# Patient Record
Sex: MALE | Race: WHITE | Employment: STUDENT | URBAN - METROPOLITAN AREA
[De-identification: names, ages, dates, MRNs, and addresses within clinical notes are randomized per-mention and may not be internally consistent; named-entity substitution may affect disease eponyms.]

---
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
## 2018-08-16 ENCOUNTER — OFFICE VISIT (OUTPATIENT)
Dept: OPHTHALMOLOGY | Facility: CLINIC | Age: 18
End: 2018-08-16
Attending: OPHTHALMOLOGY
Payer: COMMERCIAL

## 2018-08-16 DIAGNOSIS — H02.055 TRICHIASIS OF LEFT LOWER EYELID: Primary | ICD-10-CM

## 2018-08-16 DIAGNOSIS — H04.123 DRY EYE SYNDROME, BILATERAL: ICD-10-CM

## 2018-08-16 DIAGNOSIS — H10.13 ALLERGIC CONJUNCTIVITIS, BILATERAL: ICD-10-CM

## 2018-08-16 PROCEDURE — 67820 REVISE EYELASHES: CPT | Mod: ZF | Performed by: OPHTHALMOLOGY

## 2018-08-16 PROCEDURE — 68761 CLOSE TEAR DUCT OPENING: CPT | Mod: ZF | Performed by: OPHTHALMOLOGY

## 2018-08-16 PROCEDURE — G0463 HOSPITAL OUTPT CLINIC VISIT: HCPCS | Mod: 25

## 2018-08-16 RX ORDER — CYCLOSPORINE 0.5 MG/ML
1 EMULSION OPHTHALMIC 2 TIMES DAILY
Qty: 180 EACH | Refills: 3 | Status: SHIPPED | OUTPATIENT
Start: 2018-08-16 | End: 2019-01-28

## 2018-08-16 ASSESSMENT — CONF VISUAL FIELD
OD_NORMAL: 1
OS_NORMAL: 1
METHOD: COUNTING FINGERS

## 2018-08-16 ASSESSMENT — TONOMETRY
IOP_METHOD: TONOPEN
OS_IOP_MMHG: 15
OD_IOP_MMHG: 17

## 2018-08-16 ASSESSMENT — REFRACTION_WEARINGRX
OS_AXIS: 069
OD_SPHERE: -6.00
OS_CYLINDER: +0.25
OS_SPHERE: -4.50
SPECS_TYPE: SVL
OD_CYLINDER: +0.50
OD_AXIS: 118

## 2018-08-16 ASSESSMENT — VISUAL ACUITY
OS_CC: 20/20
OS_CC+: -2
OD_CC+: -1
OD_CC: 20/20
CORRECTION_TYPE: GLASSES
METHOD: SNELLEN - LINEAR

## 2018-08-16 NOTE — PROGRESS NOTES
CC: F/up for allergic vs. Atopic keratoconjunctivitis    17 yo male with history of eczema and asthma and chronic conjunctivitis x 2 years, symptoms nearly year round      Interval:     Vision stable. Symptoms stable, controlled. Sometimes red. Denies photophobia or flashes. Has few floaters but has had them for years.     He has not been using contact lens, as was unable to tolerate CL prior      Cetirizine 10 mg daily  Patanol: stopped  Restasis BID BE  Pred Healon BID BE  PFATs prn, last used 1 week ago      A/P:      1. Mild allergic / atopic conjunctivitis both eyes.  Dry eye with low aqueous tear production. Seasonal variation but eyes remain red even when upper respiratory symptoms are better.  Significant dryness symptoms.    - surface looks good today   - healthy surface, no punctate epithelial erosions.  Very low tear lake.      - continue pred healon 0.1% to twice a day and restasis twice a day    - Continue cetirizine 10 mg daily, PFAT as needed, try switching brands    - BLL PPs placed (silicone) last visit, still in place    - place UL PPs today    2. Trichiasis CLARISSE  -misdirected lash pulled today    Ok to do a trial off pred healon on his own and assess symptoms  Trial off cetirizine if desired, reassess symptoms  Preservative free artificial tears 10 min before / after restasis to modulate burning symptoms  Return to clinic 6 months, prn sooner      Melissa Barcenas MD  PGY-5, Cornea Fellow      ~~~~~~~~~~~~~~~~~~~~~~~~~~~~~~~~~~~~~~~~~~~~~~~~~~~~~~~~~~~~~~~~    Complete documentation of historical and exam elements from today's encounter can be found in the full encounter summary report (not reduplicated in this progress note). I personally obtained the chief complaint(s) and history of present illness.  I confirmed and edited as necessary the review of systems, past medical/surgical history, family history, social history, and examination findings as documented by others.  I examined the  patient myself, and I personally reviewed the relevant tests, images, and reports as documented above. I formulated and edited as necessary the assessment and plan and discussed the findings and management plan with the patient and family.     I was personally present for the entirety of the in-office procedure.     Wilber Lanza MD, MA  Director, Cornea & Anterior Segment  [facility] Department of Ophthalmology & Visual Neuroscience

## 2018-08-16 NOTE — NURSING NOTE
Chief Complaints and History of Present Illnesses   Patient presents with     Follow Up For     Mild allergic / atopic conjunctivitis both eyes.     HPI    Affected eye(s):  Both   Symptoms:     No blurred vision   No decreased vision   Floaters (Comment: Pt only sees when there is bright backround, they are very subtle.)   No flashes   No tearing   Dryness   Itching   No burning   No photophobia         Do you have eye pain now?:  No      Comments:  Pt says vision hasn't changed.  Feels symptoms are pretty well controlled.  Restasis makes eyes feel dry for 20 min after application.  Sane effect with Prednisone Drops but less dry for less time.      Martir Carter [facility] August 16, 2018 9:47 AM  Lara Gutierrez@ [facility] 10:01 AM August 16, 2018

## 2018-08-16 NOTE — MR AVS SNAPSHOT
After Visit Summary   2018    Mc Forde    MRN: [id number]           Patient Information     Date Of Birth          2000        Visit Information        Provider Department      2018 9:15 AM Wilber Lanza MD [facility]        Today's Diagnoses     Trichiasis of left lower eyelid    -  1    Allergic conjunctivitis, bilateral        Dry eye syndrome, bilateral           Follow-ups after your visit        Your next 10 appointments already scheduled     2019 12:30 PM CST   RETURN CORNEA with Wilber Lanza MD   Eye Clinic ([facility])    [address]  9[facility] [address] [place] [id number]   [phone]              Who to contact     Please call your clinic at [phone] to:    Ask questions about your health    Make or cancel appointments    Discuss your medicines    Learn about your test results    Speak to your doctor            Additional Information About Your Visit        MyChart Information     Sedimapt is an electronic gateway that provides easy, online access to your medical records. With Mailbox, you can request a clinic appointment, read your test results, renew a prescription or communicate with your care team.     To sign up for Sedimapt visit the website at [email or web address]   You will be asked to enter the access code listed below, as well as some personal information. Please follow the directions to create your username and password.     Your access code is: [email or web address]  Expires: 10/22/2018  6:31 AM     Your access code will  in 90 days. If you need help or a new code, please contact your [facility] or call [phone] for assistance.        Care EveryWhere ID     This is your Care EveryWhere ID. This could be used by other organizations to access your [place] medical records  [id number]         Blood Pressure from Last 3 Encounters:   12 120/55    04/18/12 123/61    Weight from Last 3 Encounters:   09/03/12 61.2 kg (135 lb) (75 %)*   04/18/12 63.7 kg (140 lb 6.9 oz) (85 %)*     * Growth percentiles are based on [facility] 2-20 Years data.              We Performed the Following     Epilation of Trichiasis, Forceps     Punctal Closure, Plugs          Where to get your medicines      These medications were sent to [facility] - [place], [place] - [address]  [address] , [facility] [address]     Phone:  [phone]     cycloSPORINE 0.05 % ophthalmic emulsion    prednisolone 0.125% and hyaluronate in balanced salt Susp compounded ophthalmic suspension          Primary Care Provider Office Phone # Fax #    Don Saenz [phone]       XXX RETIRED XXX [address]  [facility] [address]        Equal Access to Services     [facility] : Hadii aad ku hadasho Sohuberali, waaxda luqadaha, qaybta kaalmada adeegyada, waxay manuelin haychilangon alonso ceballos . So [facility] [phone].    ATENCIÓN: Si habla español, tiene a medeiros disposición servicios gratuitos de asistencia lingüística. Tete al [phone].    We comply with applicable federal civil rights laws and [place] laws. We do not discriminate on the basis of race, color, national origin, age, disability, sex, sexual orientation, or gender identity.            Thank you!     Thank you for choosing EYE CLINIC  for your care. Our goal is always to provide you with excellent care. Hearing back from our patients is one way we can continue to improve our services. Please take a few minutes to complete the written survey that you may receive in the mail after your visit with us. Thank you!             Your Updated Medication List - Protect others around you: Learn how to safely use, store and throw away your medicines at www.disposemymeds.org.          This list is accurate as of 8/16/18 11:31 AM.  Always use your most recent med list.                   Brand Name  Dispense Instructions for use Diagnosis    albuterol 108 (90 Base) MCG/ACT Aepb inhaler    PROAIR RESPICLICK     Inhale into the lungs as needed        carboxymethylcellul-glycerin 0.5-0.9 % Soln ophthalmic solution    OPTIVE/REFRESH OPTIVE    90 each    Place 1 drop into both eyes 4 times daily    Dry eyes       cetirizine 10 MG tablet    zyrTEC     Take 10 mg by mouth daily        cycloSPORINE 0.05 % ophthalmic emulsion    RESTASIS    180 each    Place 1 drop into both eyes 2 times daily    Allergic conjunctivitis, bilateral, Dry eye syndrome, bilateral       fluticasone furoate 100 MCG/ACT inhaler    ARNUITY ELLIPTA     Inhale 1 puff into the lungs daily        MAXALT PO      Take by mouth as needed        prednisolone 0.125% and hyaluronate in balanced salt Susp compounded ophthalmic suspension     2 Bottle    Place 1 drop into both eyes 2 times daily bid    Allergic conjunctivitis, bilateral

## 2018-08-21 ENCOUNTER — OFFICE VISIT (OUTPATIENT)
Dept: OPHTHALMOLOGY | Facility: CLINIC | Age: 18
End: 2018-08-21
Attending: OPHTHALMOLOGY
Payer: COMMERCIAL

## 2018-08-21 DIAGNOSIS — H16.223 KERATOCONJUNCTIVITIS SICCA, NOT SPECIFIED AS SJOGREN'S, BILATERAL: ICD-10-CM

## 2018-08-21 DIAGNOSIS — H10.13 ALLERGIC CONJUNCTIVITIS, BILATERAL: ICD-10-CM

## 2018-08-21 DIAGNOSIS — H02.055 TRICHIASIS OF LEFT LOWER EYELID: Primary | ICD-10-CM

## 2018-08-21 PROCEDURE — G0463 HOSPITAL OUTPT CLINIC VISIT: HCPCS | Mod: ZF

## 2018-08-21 ASSESSMENT — VISUAL ACUITY
OS_CC: 20/20-
OD_CC: 20/20
METHOD: SNELLEN - LINEAR

## 2018-08-21 ASSESSMENT — TONOMETRY
OS_IOP_MMHG: 14
OD_IOP_MMHG: 17
IOP_METHOD: ICARE

## 2018-08-21 NOTE — NURSING NOTE
Chief Complaints and History of Present Illnesses   Patient presents with     Follow Up For     removal of punctal plugs     HPI    Affected eye(s):  Both   Symptoms:     Tearing      Duration:  2 weeks   Frequency:  Constant       Do you have eye pain now?:  No      Comments:  Eyes are watering too much from plugs placed in both eyes and would like them removed.  Vision has been warped and blurry each eye since he has too many tears on the surface of his eyes.    Terrie Head, COA 1:29 PM 08/21/2018

## 2018-08-21 NOTE — MR AVS SNAPSHOT
After Visit Summary   2018    Mc Forde    MRN: [id number]           Patient Information     Date Of Birth          2000        Visit Information        Provider Department      2018 1:30 PM Milad Lindsey MD Eye Clinic        Today's Diagnoses     Trichiasis of left lower eyelid    -  1    Allergic conjunctivitis, bilateral        Keratoconjunctivitis sicca, not specified as Sjogren's, bilateral - Both Eyes           Follow-ups after your visit        Follow-up notes from your care team     Return for as scheduled in 6 months with Cornea.      Your next 10 appointments already scheduled     2019 12:30 PM CST   RETURN CORNEA with Wilber Lanza MD   Eye Clinic ([facility])    [place] [address]  [address] [address] [phone]              Who to contact     Please call your clinic at [phone] to:    Ask questions about your health    Make or cancel appointments    Discuss your medicines    Learn about your test results    Speak to your doctor            Additional Information About Your Visit        MyChart Information     King[employer] Entertainment is an electronic gateway that provides easy, online access to your medical records. With [employer], you can request a clinic appointment, read your test results, renew a prescription or communicate with your care team.     To sign up for [employer] visit the website at [email or web address]   You will be asked to enter the access code listed below, as well as some personal information. Please follow the directions to create your username and password.     Your access code is: [email or web address]  Expires: 10/22/2018  6:31 AM     Your access code will  in 90 days. If you need help or a new code, please contact your [facility] or call [phone] for assistance.        Care EveryWhere ID     This is your Care EveryWhere ID. This could be used  by other organizations to access your [place] medical records  [id number]         Blood Pressure from Last 3 Encounters:   09/03/12 120/55   04/18/12 123/61    Weight from Last 3 Encounters:   09/03/12 61.2 kg (135 lb) (75 %)*   04/18/12 63.7 kg (140 lb 6.9 oz) (85 %)*     * Growth percentiles are based on CDC 2-20 Years data.              Today, you had the following     No orders found for display       Primary Care Provider Office Phone # Fax #    Don Saenz [phone]       XXX RETIRED XXX [address]  [place] [address]        Equal Access to Services     [facility] : Hadii aad ku hadasho Sohuberali, waaxda luqadaha, qaybta kaalmada adeegyada, waxay idiin haytejas ceballos . So [facility] [phone].    ATENCIÓN: Si habla español, tiene a medeiros disposición servicios gratuitos de asistencia lingüística. [facility] [phone].    We comply with applicable federal civil rights laws and [place] laws. We do not discriminate on the basis of race, color, national origin, age, disability, sex, sexual orientation, or gender identity.            Thank you!     Thank you for choosing EYE CLINIC  for your care. Our goal is always to provide you with excellent care. Hearing back from our patients is one way we can continue to improve our services. Please take a few minutes to complete the written survey that you may receive in the mail after your visit with us. Thank you!             Your Updated Medication List - Protect others around you: Learn how to safely use, store and throw away your medicines at www.disposemymeds.org.          This list is accurate as of 8/21/18 11:59 PM.  Always use your most recent med list.                   Brand Name Dispense Instructions for use Diagnosis    albuterol 108 (90 Base) MCG/ACT Aepb inhaler    PROAIR RESPICLICK     Inhale into the lungs as needed        carboxymethylcellul-glycerin 0.5-0.9 % Soln ophthalmic solution    OPTIVE/REFRESH  OPTIVE    90 each    Place 1 drop into both eyes 4 times daily    Dry eyes       cetirizine 10 MG tablet    zyrTEC     Take 10 mg by mouth daily        cycloSPORINE 0.05 % ophthalmic emulsion    RESTASIS    180 each    Place 1 drop into both eyes 2 times daily    Allergic conjunctivitis, bilateral, Dry eye syndrome, bilateral       fluticasone furoate 100 MCG/ACT inhaler    ARNUITY ELLIPTA     Inhale 1 puff into the lungs daily        MAXALT PO      Take by mouth as needed        prednisolone 0.125% and hyaluronate in balanced salt Susp compounded ophthalmic suspension     2 Bottle    Place 1 drop into both eyes 2 times daily bid    Allergic conjunctivitis, bilateral

## 2018-08-21 NOTE — PROGRESS NOTES
CC: Follow up for punctal plug removal placed on 8/16/18    History of allergic vs. Atopic keratoconjunctivitis    17 yo male with history of eczema and asthma and chronic conjunctivitis x 2 years, symptoms nearly year round  He has not been using contact lens, as was unable to tolerate CL prior    Interval: Reports epiphora interfering with daily tasks since having upper lids plugged; otherwise no new symptoms, vision stable    Cetirizine 10 mg daily  Patanol: stopped  Restasis BID BE  Pred Healon BID BE  PFATs prn, last used 1 week ago    A/P:  1. Mild allergic / atopic conjunctivitis both eyes.    - Dry eye with low aqueous tear production.  - Seasonal variation but eyes remain red even when upper respiratory symptoms are better.    - Surface looks good today, no punctate epithelial erosions.  - Bilateral upper lid plugs removed at slit lamp today, lower lid plugs in place  - Continue pred healon 0.1% to twice a day and restasis twice a day  - Continue cetirizine 10 mg daily, PFAT as needed, try switching brands    2. Trichiasis CLARISSE  - misdirected lashes pulled last visit, normal lash line today    Ok to do a trial off pred healon on his own and assess symptoms  Trial off cetirizine if desired, reassess symptoms  Preservative free artificial tears 10 min before / after restasis to modulate burning symptoms  Return to clinic 6 months, prn sooner    Milad Lindsey MD  PGY-3 Ophthalmology Resident  [phone]      Attending Physician Attestation:  Complete documentation of historical and exam elements from today's encounter can be found in the full encounter summary report (not reduplicated in this progress note).  I personally obtained the chief complaint(s) and history of present illness.  I confirmed and edited as necessary the review of systems, past medical/surgical history, family history, social history, and examination findings as documented by others; and I examined the patient myself.  I personally  reviewed the relevant tests, images, and reports as documented above.  I formulated and edited as necessary the assessment and plan and discussed the findings and management plan with the patient and family. . - Ravin Root MD

## 2018-08-28 ASSESSMENT — REFRACTION_WEARINGRX
OS_CYLINDER: +0.25
OD_CYLINDER: +0.50
OS_AXIS: 069
OD_SPHERE: -6.00
SPECS_TYPE: SVL
OS_SPHERE: -4.50
OD_AXIS: 118

## 2018-08-28 ASSESSMENT — VISUAL ACUITY: CORRECTION_TYPE: GLASSES

## 2018-09-24 ENCOUNTER — TELEPHONE (OUTPATIENT)
Dept: OPHTHALMOLOGY | Facility: CLINIC | Age: 18
End: 2018-09-24

## 2018-09-24 NOTE — TELEPHONE ENCOUNTER
[facility] Call Center    Phone Message    May a detailed message be left on voicemail: yes    Reason for Call: Other: The pt is asking that his RX of Restasis be transferred [facility].Please call the pt with any questions. Thanks.      Action Taken: Message routed to:  [facility] ([facility]

## 2019-01-21 ENCOUNTER — TELEPHONE (OUTPATIENT)
Dept: OPHTHALMOLOGY | Facility: CLINIC | Age: 19
End: 2019-01-21

## 2019-01-21 DIAGNOSIS — H10.13 ALLERGIC CONJUNCTIVITIS, BILATERAL: ICD-10-CM

## 2019-01-21 DIAGNOSIS — H16.229 KERATOCONJUNCTIVITIS SICCA: Primary | ICD-10-CM

## 2019-01-21 DIAGNOSIS — H04.123 DRY EYE SYNDROME, BILATERAL: ICD-10-CM

## 2019-01-21 NOTE — TELEPHONE ENCOUNTER
Prior Authorization Retail Medication Request    Medication/Dose: cycloSPORINE (RESTASIS) 0.05 % ophthalmic emulsion/ 1 drop into both eyes 2 times daily  ICD code (if different than what is on RX):  See chart  Previously Tried and Failed: See chart  Rationale:  See chart    Insurance Name: BCBS - [place] ([facility])  Insurance ID:  [id number]      Pharmacy Information (if different than what is on RX)  [facility]  Phone: [phone]    Surescripts: [id number]

## 2019-01-22 NOTE — TELEPHONE ENCOUNTER
Central Prior Authorization Team   Phone: [phone]    PA Initiation    Medication: cycloSPORINE (RESTASIS) 0.05 % ophthalmic emulsion  Insurance Company: [employer] [place] - Phone [phone] Fax [phone]  [id number] [place], [place] - [address]   Filling Pharmacy Phone: [phone]  Filling Pharmacy Fax:    Start Date: 1/22/2019

## 2019-01-24 NOTE — TELEPHONE ENCOUNTER
PRIOR AUTHORIZATION DENIED    Medication: cycloSPORINE (RESTASIS) 0.05 % ophthalmic emulsion - denied    Denial Date: 1/24/2019    Denial Rational: script has been denied because pt does not have an approved diagnosis                  Appeal Information:

## 2019-01-28 RX ORDER — CYCLOSPORINE 0.5 MG/ML
1 EMULSION OPHTHALMIC 2 TIMES DAILY
Qty: 180 EACH | Refills: 3 | Status: SHIPPED | OUTPATIENT
Start: 2019-01-28 | End: 2019-03-14

## 2019-01-30 NOTE — TELEPHONE ENCOUNTER
Medication Appeal Initiation    We have initiated an appeal for the requested medication:  Medication: cycloSPORINE (RESTASIS) 0.05 % ophthalmic emulsion - appeal submitted  Appeal Start Date:  1/30/2019  Insurance Company: [employer] [place] - Phone [phone] Fax [phone]  Comments:  Faxed appeal [facility] [phone]

## 2019-03-08 NOTE — TELEPHONE ENCOUNTER
MEDICATION APPEAL DENIED    Medication: cycloSPORINE (RESTASIS) 0.05 % ophthalmic emulsion - appeal denied    Denial Date: 2/8/2019    Denial Rational: appeal is denied because pt does not a FDA approved diagnosis    Second Level Appeal Information: waiting for denial fax

## 2019-03-12 NOTE — TELEPHONE ENCOUNTER
Note to Dr. Lang for review if ok to try Xiidra vs letter  restasis PA denied     Tony Hoffman RN 12:31 PM 03/12/19

## 2019-03-13 RX ORDER — CYCLOSPORINE 0.5 MG/ML
1 EMULSION OPHTHALMIC 2 TIMES DAILY
Qty: 180 EACH | Refills: 3 | Status: CANCELLED | OUTPATIENT
Start: 2019-03-13

## 2019-03-14 ENCOUNTER — TELEPHONE (OUTPATIENT)
Dept: OPHTHALMOLOGY | Facility: CLINIC | Age: 19
End: 2019-03-14

## 2019-03-14 DIAGNOSIS — H16.229 KERATOCONJUNCTIVITIS SICCA: ICD-10-CM

## 2019-03-14 DIAGNOSIS — H16.223 KERATITIS SICCA, BILATERAL: Primary | ICD-10-CM

## 2019-03-14 RX ORDER — CYCLOSPORINE 0.5 MG/ML
1 EMULSION OPHTHALMIC 2 TIMES DAILY
Qty: 180 EACH | Refills: 3
Start: 2019-03-14 | End: 2020-04-29

## 2019-03-14 NOTE — TELEPHONE ENCOUNTER
Reviewed by Dr. Lang  restasis diagnosis updated from keratoconjunctivits sicca M35.01 to H16.223 keratoconjunctivitis sicca    Note to PA team to restart PA process with updated diagnosis code  Tony Hoffman RN 6:58 AM 03/14/19

## 2019-03-19 NOTE — TELEPHONE ENCOUNTER
Prior Authorization Approval    Authorization Effective Date: 2/28/2019  Authorization Expiration Date: 2/28/2020  Medication: cycloSPORINE (RESTASIS) 0.05 % ophtha - APPROVED  Approved Dose/Quantity:   Reference #:     Insurance Company: [employer] [place] - Phone [phone] Fax [phone]  Expected CoPay:       CoPay Card Available:      Foundation Assistance Needed:    Which Pharmacy is filling the prescription (Not needed for infusion/clinic administered): [id number] [place], [place] - [address] [facility]  Pharmacy Notified: Yes  Patient Notified: Comment:  **Pharmacy will notify patient when script is ready for .**

## 2019-03-28 ENCOUNTER — OFFICE VISIT (OUTPATIENT)
Dept: OPHTHALMOLOGY | Facility: CLINIC | Age: 19
End: 2019-03-28
Attending: OPHTHALMOLOGY
Payer: COMMERCIAL

## 2019-03-28 DIAGNOSIS — H16.229 KERATOCONJUNCTIVITIS SICCA: Primary | ICD-10-CM

## 2019-03-28 DIAGNOSIS — H10.13 ALLERGIC CONJUNCTIVITIS, BILATERAL: ICD-10-CM

## 2019-03-28 PROCEDURE — G0463 HOSPITAL OUTPT CLINIC VISIT: HCPCS | Mod: ZF

## 2019-03-28 RX ORDER — FLUOROMETHOLONE 0.1 %
1 SUSPENSION, DROPS(FINAL DOSAGE FORM)(ML) OPHTHALMIC (EYE) 2 TIMES DAILY
Qty: 10 ML | Refills: 4 | Status: SHIPPED | OUTPATIENT
Start: 2019-03-28

## 2019-03-28 RX ORDER — FLUOROMETHOLONE 0.1 %
1 SUSPENSION, DROPS(FINAL DOSAGE FORM)(ML) OPHTHALMIC (EYE) 2 TIMES DAILY
Qty: 10 ML | Refills: 4 | Status: SHIPPED | OUTPATIENT
Start: 2019-03-28 | End: 2019-03-28

## 2019-03-28 ASSESSMENT — CONF VISUAL FIELD
OS_NORMAL: 1
METHOD: COUNTING FINGERS
OD_NORMAL: 1

## 2019-03-28 ASSESSMENT — VISUAL ACUITY
METHOD: SNELLEN - LINEAR
OD_CC: 20/20
CORRECTION_TYPE: GLASSES
OS_CC: 20/20

## 2019-03-28 ASSESSMENT — REFRACTION_WEARINGRX
OS_CYLINDER: +0.25
OD_CYLINDER: +0.50
SPECS_TYPE: SVL
OS_SPHERE: -4.50
OS_AXIS: 069
OD_AXIS: 118
OD_SPHERE: -6.00

## 2019-03-28 ASSESSMENT — TONOMETRY
IOP_METHOD: ICARE
OS_IOP_MMHG: 12
OD_IOP_MMHG: 14

## 2019-03-28 NOTE — NURSING NOTE
Chief Complaints and History of Present Illnesses   Patient presents with     Follow Up     Chief Complaint(s) and History of Present Illness(es)     Follow up for atopic conjunctivitis both eyes and Trichiasis CLARISSE.    The patient states he has been without the PredHealon for two weeks. He has noticed increased irritation and redness without the PredHealon.  The Refresh Plus makes his eyes feels worse after some time.  The Restasis still burns for ten minutes each time he inserts it.  The patient notes that his BCBS has denied his dropsf PredHealon and Restasis.  Clarissa Guerrero, FABIENNE 2:04 PM 03/28/2019

## 2019-03-28 NOTE — LETTER
19    To whom it may concern:      Mr. Mc Forde (: 2000) is [facility] for bilateral chronic keratoconjunctivitis. He has previously tried multiple topical eye drops for his symptoms including Restasis. However, due to lack of insurance coverage, the patient requires alternative treatment. Atopic keratoconjunctivitis often improves with treatment targeted at minimizing ocular surface inflammation. Xiidra would be an acceptable alternative to Restasis for the treatment of this condition.    Please contact my office if you have any questions or concerns.       Sincerely,        Eulalia Gonsalves MD

## 2019-03-28 NOTE — PROGRESS NOTES
CC: F/up for allergic vs. Atopic keratoconjunctivitis    18 yo male with history of eczema and asthma and chronic conjunctivitis x 2 years, symptoms nearly year round      Interval:   Vision stable. Symptoms stable, controlled. Sometimes red. Denies photophobia or flashes. Has few floaters but has had them for years.     He has not been using contact lens, as was unable to tolerate CL prior    Off Pred Healon 2 weeks; on Restasis still but insurance isn't covering.      Cetirizine 10 mg daily PRN  Restasis BID OU  Pred Healon BID OU (off for 2 weeks due to spring break)  PFATs prn, last used 1 week ago      A/P:    1. Mild allergic / atopic conjunctivitis both eyes.  Dry eye with low aqueous tear production. Seasonal variation but eyes remain red even when upper respiratory symptoms are better.  Significant dryness symptoms.    - surface looks good today     - healthy surface, no punctate epithelial erosions.  Very low tear lake.      - continue pred healon 0.1% to twice a day;  - insurance not paying for restasis - discussed options of trying Xiidra - will try Xiidra BID OU    - Continue cetirizine 10 mg daily, PFAT as needed, try switching brands    - LLL punctal plugs in place; had bilateral upper plugs     2. Trichiasis CLARISSE  -misdirected lash pulled today      Preservative free artificial tears 10 min before / after restasis to modulate burning symptoms  Return to clinic 6 months, prn sooner      Gilson Lang M.D.  PGY-3, Ophthalmology      ~~~~~~~~~~~~~~~~~~~~~~~~~~~~~~~~~~~~~~~~~~~~~~~~~~~~~~~~~~~~~~~~    Complete documentation of historical and exam elements from today's encounter can be found in the full encounter summary report (not reduplicated in this progress note). I personally obtained the chief complaint(s) and history of present illness.  I confirmed and edited as necessary the review of systems, past medical/surgical history, family history, social history, and examination findings as documented  by others.  I examined the patient myself, and I personally reviewed the relevant tests, images, and reports as documented above. I formulated and edited as necessary the assessment and plan and discussed the findings and management plan with the patient and family.     I was personally present for the entirety of the in-office procedure.     Wilber Lanza MD, MA  Director, Cornea & Anterior Segment  [facility]

## 2019-03-29 ENCOUNTER — TELEPHONE (OUTPATIENT)
Dept: OPHTHALMOLOGY | Facility: CLINIC | Age: 19
End: 2019-03-29

## 2019-03-29 NOTE — TELEPHONE ENCOUNTER
Prior Authorization Retail Medication Request    Medication/Dose: lifitegrast (XIIDRA) 5 % opthalmic solution/ Place 1 drop into both eyes 2 times daily - Both E  ICD code (if different than what is on RX):  See chart  Previously Tried and Failed: See chart  Rationale:  See chart     Insurance Name: BCBS - [place] ([facility])  Insurance ID:  [id number]      Pharmacy Information (if different than what is on RX)  [facility]  Phone: [phone]    Surescript ID: [id number]

## 2019-03-29 NOTE — TELEPHONE ENCOUNTER
PA Initiation    Medication: lifitegrast (XIIDRA) 5 % opthalmic solution  Insurance Company: [employer] [place] - Phone [phone] Fax [phone]  [id number] [place], [place] - [address] [place] [address]  Filling Pharmacy Phone: [phone]  Filling Pharmacy Fax:    Start Date: 3/29/2019

## 2019-04-01 NOTE — TELEPHONE ENCOUNTER
PRIOR AUTHORIZATION DENIED    Medication: lifitegrast (XIIDRA) 5 % opthalmic solution - denied    Denial Date: 4/1/2019    Denial Rational: script is denied because plan shows pt is currently treated with Restasis                       Appeal Information:

## 2019-11-19 DIAGNOSIS — H04.123 DRY EYES: ICD-10-CM

## 2019-11-19 NOTE — TELEPHONE ENCOUNTER
Last Clinic Visit: 3/28/19, with recommended 6 month follow up.  No appointments scheduled  Last clinic note: PFATs prn, last used 1 week ago

## 2019-12-23 ENCOUNTER — HOSPITAL ENCOUNTER (EMERGENCY)
Facility: CLINIC | Age: 19
Discharge: HOME | End: 2019-12-23
Attending: EMERGENCY MEDICINE | Admitting: EMERGENCY MEDICINE
Payer: COMMERCIAL

## 2019-12-23 VITALS
OXYGEN SATURATION: 98 % | BODY MASS INDEX: 21.47 KG/M2 | DIASTOLIC BLOOD PRESSURE: 74 MMHG | HEIGHT: 73 IN | TEMPERATURE: 99 F | HEART RATE: 80 BPM | SYSTOLIC BLOOD PRESSURE: 132 MMHG | RESPIRATION RATE: 18 BRPM | WEIGHT: 162 LBS

## 2019-12-23 DIAGNOSIS — J03.90 TONSILLITIS: ICD-10-CM

## 2019-12-23 PROCEDURE — 42999 UNLISTED PX PHRNX ADND/TNSL: CPT

## 2019-12-23 PROCEDURE — 25000125 ZZHC RX 250: Performed by: EMERGENCY MEDICINE

## 2019-12-23 PROCEDURE — 25000128 H RX IP 250 OP 636: Performed by: EMERGENCY MEDICINE

## 2019-12-23 PROCEDURE — 99284 EMERGENCY DEPT VISIT MOD MDM: CPT | Mod: 25

## 2019-12-23 PROCEDURE — 96372 THER/PROPH/DIAG INJ SC/IM: CPT

## 2019-12-23 RX ORDER — OXYCODONE AND ACETAMINOPHEN 5; 325 MG/1; MG/1
1 TABLET ORAL EVERY 6 HOURS PRN
Qty: 8 TABLET | Refills: 0 | Status: SHIPPED | OUTPATIENT
Start: 2019-12-23

## 2019-12-23 RX ORDER — KETOROLAC TROMETHAMINE 15 MG/ML
15 INJECTION, SOLUTION INTRAMUSCULAR; INTRAVENOUS ONCE
Status: COMPLETED | OUTPATIENT
Start: 2019-12-23 | End: 2019-12-23

## 2019-12-23 RX ADMIN — KETOROLAC TROMETHAMINE 15 MG: 15 INJECTION, SOLUTION INTRAMUSCULAR; INTRAVENOUS at 21:30

## 2019-12-23 RX ADMIN — LIDOCAINE HYDROCHLORIDE 3 ML: 40 INJECTION, SOLUTION RETROBULBAR; TOPICAL at 20:31

## 2019-12-23 ASSESSMENT — MIFFLIN-ST. JEOR: SCORE: 1793.71

## 2019-12-23 ASSESSMENT — ENCOUNTER SYMPTOMS
TROUBLE SWALLOWING: 1
COUGH: 1
SORE THROAT: 1
HEADACHES: 1

## 2019-12-23 NOTE — ED AVS SNAPSHOT
Emergency Department  [address] [place] [address]  Phone:  [phone]  Fax:  [phone]                                    Mc Forde   MRN: [id number]    Department:   Emergency Department   Date of Visit:  12/23/2019           After Visit Summary Signature Page    I have received my discharge instructions, and my questions have been answered. I have discussed any challenges I see with this plan with the nurse or doctor.    ..........................................................................................................................................  Patient/Patient Representative Signature      ..........................................................................................................................................  Patient Representative Print Name and Relationship to Patient    ..................................................               ................................................  Date                                   Time    ..........................................................................................................................................  Reviewed by Signature/Title    ...................................................              ..............................................  Date                                               Time          22EPIC Rev 08/18

## 2019-12-24 ENCOUNTER — HOSPITAL ENCOUNTER (EMERGENCY)
Facility: CLINIC | Age: 19
Discharge: HOME | End: 2019-12-24
Attending: EMERGENCY MEDICINE | Admitting: EMERGENCY MEDICINE
Payer: COMMERCIAL

## 2019-12-24 VITALS
HEART RATE: 86 BPM | HEIGHT: 73 IN | SYSTOLIC BLOOD PRESSURE: 120 MMHG | DIASTOLIC BLOOD PRESSURE: 77 MMHG | WEIGHT: 161 LBS | TEMPERATURE: 98 F | RESPIRATION RATE: 18 BRPM | OXYGEN SATURATION: 97 % | BODY MASS INDEX: 21.34 KG/M2

## 2019-12-24 DIAGNOSIS — J36 PERITONSILLAR ABSCESS: ICD-10-CM

## 2019-12-24 LAB
ANION GAP SERPL CALCULATED.3IONS-SCNC: 7 MMOL/L (ref 3–14)
BASOPHILS # BLD AUTO: 0 10E9/L (ref 0–0.2)
BASOPHILS NFR BLD AUTO: 0.2 %
BUN SERPL-MCNC: 20 MG/DL (ref 7–30)
CALCIUM SERPL-MCNC: 9.6 MG/DL (ref 8.5–10.1)
CHLORIDE SERPL-SCNC: 107 MMOL/L (ref 94–109)
CO2 SERPL-SCNC: 25 MMOL/L (ref 20–32)
CREAT SERPL-MCNC: 0.81 MG/DL (ref 0.66–1.25)
DIFFERENTIAL METHOD BLD: ABNORMAL
EOSINOPHIL # BLD AUTO: 0 10E9/L (ref 0–0.7)
EOSINOPHIL NFR BLD AUTO: 0.1 %
ERYTHROCYTE [DISTWIDTH] IN BLOOD BY AUTOMATED COUNT: 12.4 % (ref 10–15)
GFR SERPL CREATININE-BSD FRML MDRD: >90 ML/MIN/{1.73_M2}
GLUCOSE SERPL-MCNC: 98 MG/DL (ref 70–99)
HCT VFR BLD AUTO: 40 % (ref 40–53)
HGB BLD-MCNC: 13.8 G/DL (ref 13.3–17.7)
IMM GRANULOCYTES # BLD: 0 10E9/L (ref 0–0.4)
IMM GRANULOCYTES NFR BLD: 0.2 %
LYMPHOCYTES # BLD AUTO: 2 10E9/L (ref 0.8–5.3)
LYMPHOCYTES NFR BLD AUTO: 16.7 %
MCH RBC QN AUTO: 30.3 PG (ref 26.5–33)
MCHC RBC AUTO-ENTMCNC: 34.5 G/DL (ref 31.5–36.5)
MCV RBC AUTO: 88 FL (ref 78–100)
MONOCYTES # BLD AUTO: 1.5 10E9/L (ref 0–1.3)
MONOCYTES NFR BLD AUTO: 12.4 %
NEUTROPHILS # BLD AUTO: 8.5 10E9/L (ref 1.6–8.3)
NEUTROPHILS NFR BLD AUTO: 70.4 %
NRBC # BLD AUTO: 0 10*3/UL
NRBC BLD AUTO-RTO: 0 /100
PLATELET # BLD AUTO: 217 10E9/L (ref 150–450)
POTASSIUM SERPL-SCNC: 3.6 MMOL/L (ref 3.4–5.3)
RBC # BLD AUTO: 4.56 10E12/L (ref 4.4–5.9)
SODIUM SERPL-SCNC: 139 MMOL/L (ref 133–144)
WBC # BLD AUTO: 12.1 10E9/L (ref 4–11)

## 2019-12-24 PROCEDURE — 25800030 ZZH RX IP 258 OP 636: Performed by: EMERGENCY MEDICINE

## 2019-12-24 PROCEDURE — 85025 COMPLETE CBC W/AUTO DIFF WBC: CPT | Performed by: EMERGENCY MEDICINE

## 2019-12-24 PROCEDURE — 80048 BASIC METABOLIC PNL TOTAL CA: CPT | Performed by: EMERGENCY MEDICINE

## 2019-12-24 PROCEDURE — 99284 EMERGENCY DEPT VISIT MOD MDM: CPT | Mod: 25

## 2019-12-24 PROCEDURE — 96361 HYDRATE IV INFUSION ADD-ON: CPT

## 2019-12-24 PROCEDURE — 96360 HYDRATION IV INFUSION INIT: CPT

## 2019-12-24 PROCEDURE — 42999 UNLISTED PX PHRNX ADND/TNSL: CPT

## 2019-12-24 RX ORDER — PREDNISONE 20 MG/1
TABLET ORAL
Qty: 10 TABLET | Refills: 0 | Status: SHIPPED | OUTPATIENT
Start: 2019-12-24

## 2019-12-24 RX ORDER — SODIUM CHLORIDE 9 MG/ML
1000 INJECTION, SOLUTION INTRAVENOUS CONTINUOUS
Status: DISCONTINUED | OUTPATIENT
Start: 2019-12-24 | End: 2019-12-24 | Stop reason: HOSPADM

## 2019-12-24 RX ORDER — ONDANSETRON 4 MG/1
4 TABLET, ORALLY DISINTEGRATING ORAL EVERY 8 HOURS PRN
Qty: 10 TABLET | Refills: 0 | Status: SHIPPED | OUTPATIENT
Start: 2019-12-24 | End: 2019-12-27

## 2019-12-24 RX ORDER — OXYCODONE AND ACETAMINOPHEN 5; 325 MG/1; MG/1
1-2 TABLET ORAL EVERY 4 HOURS PRN
Qty: 12 TABLET | Refills: 0 | Status: SHIPPED | OUTPATIENT
Start: 2019-12-24

## 2019-12-24 RX ADMIN — SODIUM CHLORIDE 1000 ML: 9 INJECTION, SOLUTION INTRAVENOUS at 06:02

## 2019-12-24 RX ADMIN — SODIUM CHLORIDE 1000 ML: 9 INJECTION, SOLUTION INTRAVENOUS at 04:16

## 2019-12-24 ASSESSMENT — ENCOUNTER SYMPTOMS
SORE THROAT: 1
VOICE CHANGE: 1

## 2019-12-24 ASSESSMENT — MIFFLIN-ST. JEOR: SCORE: 1789.17

## 2019-12-24 NOTE — ED TRIAGE NOTES
Cold symptoms and sore throat ongoing for the past week, now having increasing sore throat. Right sided throat pain. Hx of intra-tonsilar abcess on right.

## 2019-12-24 NOTE — ED PROVIDER NOTES
History     Chief Complaint:  Pharyngitis     HPI  Mc Forde is a 19 year old male who presents with pharyngitis. The patient had an URI 1.5 weeks ago and had a negative flu test and was sent home. Since, the patient has had pain in his throat on the left side. This has progressively worsened and also switched to the right side. 3 days ago, the patient's mother gave the patient 12 mg of dexamethasone around 1000 with improvement the next morning. The patient was seen yesterday and given Toradol and sent home with Augmentin and Percocet. Tonight, the patient's mother became concerned for uvula shift and voice change and has taken him tot he ED. Here, mother reports history of intratonsillar abscess in 2015 on the right.     Allergies:  Patient has no known allergies.    Medications:    Albuterol Sulfate 108 (90 BASE) MCG/ACT AEPB  amoxicillin-clavulanate (AUGMENTIN) 875-125 MG tablet  carboxymethylcellul-glycerin (OPTIVE/REFRESH OPTIVE) 0.5-0.9 % SOLN ophthalmic solution  cetirizine (ZYRTEC) 10 MG tablet  cycloSPORINE (RESTASIS) 0.05 % ophthalmic emulsion  fluorometholone (FML LIQUIFILM) 0.1 % ophthalmic suspension  fluticasone furoate (ARNUITY ELLIPTA) 100 MCG/ACT AEPB inhalation powder  lifitegrast (XIIDRA) 5 % opthalmic solution  oxyCODONE-acetaminophen (PERCOCET) 5-325 MG tablet  prednisolone 0.125% and hyaluronate in balanced salt SUSP compounded ophthalmic suspension  Rizatriptan Benzoate (MAXALT PO)    Past Medical History:    Asthma      Past Surgical History:    The patient does not have any pertinent past surgical history.    Family History:    Glaucoma   Macular degeneration     Social History:  Marital Status:  Single   Smoker:   Never   Smokeless:   Never   Alcohol:   Yes, occasional   Drugs:   No   Arrives with:   Mother     Review of Systems   HENT: Positive for sore throat and voice change.    All other systems reviewed and are negative.    Physical Exam     Patient Vitals for the past 24  "hrs:   BP Temp Temp src Pulse Heart Rate Resp SpO2 Height Weight   12/24/19 0500 120/77 -- -- 86 -- -- 97 % -- --   12/24/19 0430 123/64 -- -- 63 -- -- 94 % -- --   12/24/19 0400 133/83 -- -- 88 -- -- 96 % -- --   12/24/19 0345 (!) 145/92 -- -- -- -- -- -- -- --   12/24/19 0338 134/70 98  F (36.7  C) Oral -- 90 18 98 % 1.854 m (6' 1\") 73 kg (161 lb)     Physical Exam  GENERAL: well developed, pleasant  HEAD: atraumatic  EYES: pupils reactive, extraocular muscles intact, conjunctivae normal  ENT:  mucus membranes moist. Peritonsillar swelling and erythema on the right.   NECK:  trachea midline, normal range of motion  RESPIRATORY: no tachypnea, breath sounds clear to auscultation   CVS: normal S1/S2, no murmurs, intact distal pulses  ABDOMEN: soft, nontender, nondistention  MUSCULOSKELETAL: no deformities  SKIN: warm and dry, no acute rashes or ulceration  NEURO: GCS 15, cranial nerves intact, alert and oriented x3  PSYCH:  Mood/affect normal          Emergency Department Course   Laboratory:  CBC: WBC: 12.1, HGB: 13.8, PLT: 217  BMP: WNL (Creatinine: 0.81)    Procedures:   PROCEDURE NOTE: Peritonsillar Abscess Aspiration     PROCEDURE:  Aspiration of right tonsil   INDICATION:  right sided peritonsillar abscess.    DESCRIPTION OF PROCEDURE:    The patient was informed of the indications, technique, risk and benefit of aspiration. POC US was used to identify possible fluid collection. Site was correctly identified. The patient was anesthetized with 1 % lidocaine with epinephrine directly.  Using a 18 gauge needle the right tonsil was aspirated. 1 cc purulent fluid was obtained. Patient tolerated the procedure well, no complications.  Family did ask that we try further upon recheck which I did and not further puss was expressed.      Interventions:  0416: NS 1L IV Bolus   0602: NS 1L IV Bolus     Emergency Department Course:  0345 I performed an exam of the patient as documented above.   0555 Peritonsillar abscess " drained.    Findings and plan explained. Patient discharged home with instructions regarding supportive care, medications, and reasons to return. The importance of close follow-up was reviewed. I personally reviewed the workup results with them and answered all related questions prior to discharge.    Impression & Plan    Medical Decision Making:  Mc Forde is a 19 year old male who presents for pharyngitis and recent visit for similar.  Attempted drainage was made before.  Feels that symptoms have worsened.  He is here with his mother.  Ultrasound probe was used and showed area concerning for abscess.  Area was anesthetized with 1% lidocaine and epinephrine.  18-gauge needle was used and 1 cc of pus was expressed.  Pressing the area with my finger and Q-tips more pus was expressed and oozing out.  Patient was given 2 L of fluid for hydration.  In an attempt to avoid any future ED visits they did request further I&D.  Microblade was used at the area without further pus.  Patient has no trismus and feels improvement.  Discussed outpatient management with him.    Diagnosis:    ICD-10-CM    1. Peritonsillar abscess J36      Disposition:  Discharged home with Zofran, Percocet, and Deltasone.    Discharge Medications:  New Prescriptions    ONDANSETRON (ZOFRAN ODT) 4 MG ODT TAB    Take 1 tablet (4 mg) by mouth every 8 hours as needed for nausea    OXYCODONE-ACETAMINOPHEN (PERCOCET) 5-325 MG TABLET    Take 1-2 tablets by mouth every 4 hours as needed for pain    PREDNISONE (DELTASONE) 20 MG TABLET    Take two tablets (= 40mg) each day for 5 (five) days     Scribe Disclosure: IDen, am serving as a scribe on 12/24/2019 at 3:45 AM to personally document services performed by Ishmael Medeiros MD based on my observations and the provider's statements to me.      Ishmael Medeiros MD  12/25/19 0038

## 2019-12-24 NOTE — ED PROVIDER NOTES
"  History     Chief Complaint:  Pharyngitis    The history is provided by the patient and a parent.      Mc Forde is a 19 year old male with a history of intratonsillar tonsil abscess who presents with pharyngitis. The patient states that he has had a URI with cough, congestion, headache, and pharyngitis. The patient then started to develop a worsening sore throat over the last couple of days, especially on the right side. He states that has become difficult to eat due to pain. The patient's mother who is a physician treated her son with analgesia, magic mouthwash and dexamethasone.     Allergies:  No Known Allergies     Medications:    Adderall  vyvanse  Albuterol neb   Symbicort   Singulair     Past Medical History:    Asthma  intratonsillar tonsil abscess     Past Surgical History:    History reviewed. No pertinent surgical history.    Family History:    No past pertinent family history.    Social History:  The patient was accompanied to the ED by mother, (physician).  Smoking Status: Never Smoker  Smokeless Tobacco: Never Used  Alcohol Use: Positive  Marital Status:  Single     Review of Systems   HENT: Positive for congestion, sore throat and trouble swallowing.    Respiratory: Positive for cough.    Neurological: Positive for headaches.   All other systems reviewed and are negative.      Physical Exam     Patient Vitals for the past 24 hrs:   BP Temp Temp src Pulse Heart Rate Resp SpO2 Height Weight   12/23/19 1923 131/81 99  F (37.2  C) Temporal 89 89 18 98 % 1.854 m (6' 1\") 73.5 kg (162 lb)     Physical Exam  Vitals: reviewed by me  General: Pt seen on [facility], pleasant, cooperative, and alert to conversation  Eyes: Tracking well, clear conjunctiva BL  ENT: MMM, midline trachea.  Full range of motion to neck, neck is supple, submandibular space is soft and supple.  Does have right greater than left tonsillar swelling with exudate is noted on the tonsils.  Slight inflammation of the superior " aspect of the anterior tonsillar pillar.  No clear fluctuance, but does appear to be more irritated than the contralateral.  Airway widely patent.  Lungs: No tachypnea, no accessory muscle use. No respiratory distress.   CV: Rate as above, regular rhythm.    Abd: Soft, non tender, no guarding, no rebound. Non distended  MSK: no peripheral edema or joint effusion.  No evidence of trauma  Skin: No rash, normal turgor and temperature  Neuro: Clear speech and no facial droop.  Psych: Not RIS, no e/o AH/      Emergency Department Course     Procedures    Incision and Drainage       Peritonsillar Abscess Incision and Drainage Procedure Note:     Procedure:  Needle aspiration of peritonsillar abscess    Indication:  Right peritonsillar abscess    Consent:  Risks (including but not limited to: bleeding, pain, aspiration, carotid artery injury), benefits and alternatives were discussed with  patient and parent(s) and consent for procedure was obtained.    Timeout:  Universal protocol was followed. TIME OUT conducted just prior to starting procedure confirmed patient identity, site/side, procedure, patient position, and availability of correct equipment and implants.?  Yes    Anesthesia:  Topical anesthesia with Cetacaine spray, followed by local infiltration using Lidocaine 4% plain inhalant, total of 3 mLs.    Procedure:  Abscess site was correctly identified.  Using an 18-gauge needle with its protective covering in place (end of cover trimmed, exposing 1 cm of distal aspect of needle), the site of maximal fluctuance was entered. No drainage. Needle was removed and disposed appropriately.    Patient Status:  Patient tolerated the procedure well.  There were no complications.    Interventions:  2031 lidocaine 4% 3 mL nebulizer   2130 Toradol 15 mg IM     Emergency Department Course:     Nursing notes and vitals reviewed.    2000 I performed an exam of the patient as documented above.     2030 I prepped the patient for the  procedure.     2050 I performed an I&D on the patient, see procedure note above.     2055 I answered all questions prior to discharge.     Impression & Plan      Medical Decision Making:  Mc Forde is a 19 year old male who presents to the emergency department today for evaluation of what appears to tonsillitis. He had a very small area of what appeared to be possible fluctuance in the anterior tonsillar pillar and I did attempt drainage here, please see  I&D note as above. I was not able to get anything, and after careful inspection I do not think there is a excisable fluid pocket. This has happened in the past, at which point he received antibiotics and did quite well. He airway is amply patent and he is doing well otherwise. Will plan for discharge home with medications and very clear return precautions.     Diagnosis:    ICD-10-CM    1. Tonsillitis J03.90      Disposition:   Findings and plan explained to the Patient and mother. Patient discharged home with instructions regarding supportive care, medications, and reasons to return. The importance of close follow-up was reviewed. The patient was prescribed Augmentin.     Discharge Medications:    START taking      Dose / Directions   amoxicillin-clavulanate 875-125 MG tablet  Commonly known as:  AUGMENTIN      Dose:  1 tablet  Take 1 tablet by mouth 2 times daily for 10 days  Quantity:  20 tablet  Refills:  0     oxyCODONE-acetaminophen 5-325 MG tablet  Commonly known as:  Percocet      Dose:  1 tablet  Take 1 tablet by mouth every 6 hours as needed for moderate to severe pain  Quantity:  8 tablet  Refills:  0       Scribe Disclosure:  I, Eloise Elizabeth, am serving as a scribe at 8:01 PM on 12/23/2019 to document services personally performed by Wilber Lewis MD based on my observations and the provider's statements to me.     EMERGENCY DEPARTMENT       Wilber Lewis MD  12/23/19 [id number]

## 2019-12-24 NOTE — ED TRIAGE NOTES
Patient was here earlier today with similar complaints of throat swelling and pain, unable to swallow secretions, eat or drink.

## 2020-04-29 DIAGNOSIS — H16.223 KERATITIS SICCA, BILATERAL: ICD-10-CM

## 2020-04-29 RX ORDER — CYCLOSPORINE 0.5 MG/ML
1 EMULSION OPHTHALMIC 2 TIMES DAILY
Qty: 180 EACH | Refills: 3 | Status: SHIPPED | OUTPATIENT
Start: 2020-04-29

## 2020-04-29 NOTE — TELEPHONE ENCOUNTER
Medication: cycloSPORINE (RESTASIS) 0.05 % ophthalmic emulsion   Diagnosis:  Keratitis sicca, bilateral   Requested directions: same  Current directions on the medication list: Place 1 drop into both eyes 2 times daily     Last Written Prescription Date:  3/14/19  Last Fill Quantity: 180 each,   # refills: 3    Last Office Visit: 3/28/19  Future Office visit: none    Attending Provider: Wilber Lanza MD   Last Clinic Note: 3/28/19  - insurance not paying for restasis - discussed options of trying Xiidra - will try Xiidra BID each eye  - Return in about 6 months (around 9/28/2019) for Surface check, Sooner PRN.    Routing refill request to provider for review/approval because:  Requires provider review  Inconsistent dose/directions  - insurance not paying for restasis - discussed options of trying Xiidra - will try Xiidra BID each eye

## 2020-04-29 NOTE — TELEPHONE ENCOUNTER
According to the note in 3/2019, patient had issues with insurance covering this eyedrop. But his last date filled was 1/21/2020. Will refill the eyedrops at this time. Please forward the note to the  to make an appointment for next available cornea appointment; if patient continues to have insurance issue, may discuss at the next visit.

## 2020-12-29 NOTE — ED AVS SNAPSHOT
Emergency Department  [address] [place] [id number]  Phone:  [phone]  Fax:  [phone]                                    Mc Forde   MRN: [id number]    Department:   Emergency Department   Date of Visit:  12/24/2019           After Visit Summary Signature Page    I have received my discharge instructions, and my questions have been answered. I have discussed any challenges I see with this plan with the nurse or doctor.    ..........................................................................................................................................  Patient/Patient Representative Signature      ..........................................................................................................................................  Patient Representative Print Name and Relationship to Patient    ..................................................               ................................................  Date                                   Time    ..........................................................................................................................................  Reviewed by Signature/Title    ...................................................              ..............................................  Date                                               Time          22EPIC Rev 08/18        shortness of breath

## 2024-12-17 NOTE — DISCHARGE INSTRUCTIONS
Come back to the emergency room if you are unable to take oral liquids, or if you do not think antibiotics are working.  Please follow with your regular doctor in 1 week's time if you do not feel complete resolution.  
17.1